# Patient Record
Sex: MALE
[De-identification: names, ages, dates, MRNs, and addresses within clinical notes are randomized per-mention and may not be internally consistent; named-entity substitution may affect disease eponyms.]

---

## 2018-12-09 ENCOUNTER — HOSPITAL ENCOUNTER (INPATIENT)
Dept: HOSPITAL 31 - C.ER | Age: 28
LOS: 3 days | Discharge: HOME | DRG: 773 | End: 2018-12-12
Attending: PSYCHIATRIC UNIT | Admitting: PSYCHIATRIC UNIT
Payer: MEDICAID

## 2018-12-09 VITALS — BODY MASS INDEX: 22.1 KG/M2

## 2018-12-09 DIAGNOSIS — F11.23: Primary | ICD-10-CM

## 2018-12-09 DIAGNOSIS — F17.210: ICD-10-CM

## 2018-12-09 DIAGNOSIS — F43.22: ICD-10-CM

## 2018-12-09 DIAGNOSIS — F14.90: ICD-10-CM

## 2018-12-09 DIAGNOSIS — F12.90: ICD-10-CM

## 2018-12-09 LAB
ALBUMIN SERPL-MCNC: 3.9 G/DL (ref 3.5–5)
ALBUMIN/GLOB SERPL: 1.1 {RATIO} (ref 1–2.1)
ALT SERPL-CCNC: 28 U/L (ref 21–72)
AST SERPL-CCNC: 20 U/L (ref 17–59)
BASOPHILS # BLD AUTO: 0 K/UL (ref 0–0.2)
BASOPHILS NFR BLD: 0.3 % (ref 0–2)
BILIRUB UR-MCNC: NEGATIVE MG/DL
BUN SERPL-MCNC: 8 MG/DL (ref 9–20)
CALCIUM SERPL-MCNC: 9 MG/DL (ref 8.6–10.4)
EOSINOPHIL # BLD AUTO: 0.2 K/UL (ref 0–0.7)
EOSINOPHIL NFR BLD: 2.2 % (ref 0–4)
ERYTHROCYTE [DISTWIDTH] IN BLOOD BY AUTOMATED COUNT: 13.7 % (ref 11.5–14.5)
GFR NON-AFRICAN AMERICAN: > 60
GLUCOSE UR STRIP-MCNC: NORMAL MG/DL
HGB BLD-MCNC: 13.2 G/DL (ref 12–18)
LEUKOCYTE ESTERASE UR-ACNC: (no result) LEU/UL
LYMPHOCYTES # BLD AUTO: 2.7 K/UL (ref 1–4.3)
LYMPHOCYTES NFR BLD AUTO: 26.1 % (ref 20–40)
MCH RBC QN AUTO: 29.4 PG (ref 27–31)
MCHC RBC AUTO-ENTMCNC: 32.6 G/DL (ref 33–37)
MCV RBC AUTO: 90.1 FL (ref 80–94)
MONOCYTES # BLD: 0.6 K/UL (ref 0–0.8)
MONOCYTES NFR BLD: 6.2 % (ref 0–10)
NEUTROPHILS # BLD: 6.8 K/UL (ref 1.8–7)
NEUTROPHILS NFR BLD AUTO: 65.2 % (ref 50–75)
NRBC BLD AUTO-RTO: 0.1 % (ref 0–2)
PH UR STRIP: 5 [PH] (ref 5–8)
PLATELET # BLD: 243 K/UL (ref 130–400)
PMV BLD AUTO: 8.8 FL (ref 7.2–11.7)
PROT UR STRIP-MCNC: NEGATIVE MG/DL
RBC # BLD AUTO: 4.49 MIL/UL (ref 4.4–5.9)
RBC # UR STRIP: NEGATIVE /UL
SP GR UR STRIP: 1.02 (ref 1–1.03)
SQUAMOUS EPITHIAL: < 1 /HPF (ref 0–5)
UROBILINOGEN UR-MCNC: NORMAL MG/DL (ref 0.2–1)
WBC # BLD AUTO: 10.4 K/UL (ref 4.8–10.8)

## 2018-12-09 PROCEDURE — GZHZZZZ GROUP PSYCHOTHERAPY: ICD-10-PCS | Performed by: PSYCHIATRIC UNIT

## 2018-12-09 PROCEDURE — GZ56ZZZ INDIVIDUAL PSYCHOTHERAPY, SUPPORTIVE: ICD-10-PCS | Performed by: PSYCHIATRIC UNIT

## 2018-12-09 NOTE — C.PDOC
History Of Present Illness





Pt is a 28 year old male presents to ED requesting detox from heroin.  Patient 

reports that he injects, and last use was this morning. Patient also reports he 

smokes cigarettes but no alcohol use. Patient also states he is experiencing 

symptoms of withdrawal - hot flashes and abdominal pain. Denies any medical 

problems or other complaints. 





PMD: "Complete care" 





<Valente Verma Jr. - Last Filed: 12/09/18 18:58>


History Per: Patient


History/Exam Limitations: no limitations


Onset/Duration Of Symptoms: Hrs


Current Symptoms Are (Timing): Still Present


Suicide/Self Injury Attempted (Context): None


Modifying Factor(s): Narcotics (Heroin )


Associated Symptoms: denies: Suicidal Thoughts, Suicidal Plan


Involuntary Hold By: None


Recent travel outside of the [place]: No





<Valente Verma Jr. - Last Filed: 12/09/18 18:58>





<Ammon Gibson - Last Filed: 12/09/18 19:56>


Time Seen by Provider: 12/09/18 17:40


Chief Complaint (Nursing): Substance Abuse





Past Medical History


Reviewed: Historical Data, Nursing Documentation, Vital Signs


Vital Signs: 





                                Last Vital Signs











Temp  98.3 F   12/09/18 17:35


 


Pulse  82   12/09/18 17:35


 


Resp  20   12/09/18 17:35


 


BP  99/64 L  12/09/18 17:35


 


Pulse Ox  100   12/09/18 17:35














- Medical History


PMH: No Chronic Diseases


Surgical History: No Surg Hx


Family History: States: No Known Family Hx





- Social History


Hx Tobacco Use: Yes


Hx Alcohol Use: No


Hx Substance Use: Yes





- Immunization History


Hx Tetanus Toxoid Vaccination: Yes


Hx Influenza Vaccination: Yes


Hx Pneumococcal Vaccination: Yes





<Valente Verma Jr. - Last Filed: 12/09/18 18:58>


Vital Signs: 





                                Last Vital Signs











Temp  98.3 F   12/09/18 17:35


 


Pulse  82   12/09/18 17:35


 


Resp  20   12/09/18 17:35


 


BP  99/64 L  12/09/18 17:35


 


Pulse Ox  100   12/09/18 18:59














<Ammon Gibson - Last Filed: 12/09/18 19:56>





Review Of Systems


Constitutional: Positive for: Other (Hot flashes ).  Negative for: Chills


Gastrointestinal: Positive for: Abdominal Pain.  Negative for: Nausea, Vomiting,

Diarrhea


Skin: Negative for: Rash


Neurological: Negative for: Weakness, Numbness





<Valente Verma Jr. - Last Filed: 12/09/18 18:58>





Physical Exam





- Physical Exam


Appears: Non-toxic, No Acute Distress


Skin: Normal Color, Warm, Dry, No Rash


Head: Atraumatic, Normacephalic


Eye(s): bilateral: EOMI


Ear(s): Bilateral: Normal


Nose: Normal


Oral Mucosa: Moist


Tongue: Normal Appearing


Lips: Normal Appearing


Throat: Normal


Neck: Normal ROM, Supple


Chest: Symmetrical, No Tenderness


Cardiovascular: Rhythm Regular, No Murmur


Respiratory: Normal Breath Sounds, No Accessory Muscle Use, No Rales, No 

Rhonchi, No Wheezing


Gastrointestinal/Abdominal: Bowel Sounds (Active ), Soft, No Tenderness


Extremity: Normal ROM, Other (Left upper extremity track marks )


Extremity: Bilateral: Atraumatic, Normal Color And Temperature, Normal ROM


Neurological/Psych: Oriented x3, Normal Speech


Gait: Steady





<Valente Verma Jr. - Last Filed: 12/09/18 18:58>





ED Course And Treatment


O2 Sat by Pulse Oximetry: 100 (RA)


Pulse Ox Interpretation: Normal





<Valente Verma Jr. - Last Filed: 12/09/18 18:58>





- Laboratory Results


Result Diagrams: 


                                 12/09/18 19:05





                                 12/09/18 19:05





<Ammon Gibson - Last Filed: 12/09/18 19:56>





Medical Decision Making


Medical Decision Making: 





Initial Impression:  


Opioid Use Disorder





Initial Plan:


Crisis will evaluate the patient (they are aware he is here).  Will also send of

detox screening labs














<Valente Verma Jr. - Last Filed: 12/09/18 18:58>





Disposition





- Disposition


Disposition Time: 18:56





<Valente Verma Jr. - Last Filed: 12/09/18 18:58>


Discussed With DrEdy: Renato Cutler


Comment: accepted the pt on his service and took over the care at 7:55 PM


Doctor Will See Patient In The: Hospital


Counseled Patient/Family Regarding: Studies Performed, Diagnosis





<Ammon Gibson - Last Filed: 12/09/18 19:56>





- Disposition


Disposition: HOSPITALIZED


Condition: FAIR


Forms:  [facility] (English)





- Clinical Impression


Clinical Impression: 


 Opioid use disorder








- Scribe Statement


The provider has reviewed the documentation as recorded by the Scribe





Pb Pinto





All medical record entries made by the Scribe were at my direction and 

personally dictated by me. I have reviewed the chart and agree that the record 

accurately reflects my personal performance of the history, physical exam, 

medical decision making, and the department course for this patient. I have also

personally directed, reviewed, and agree with the discharge instructions and 

disposition.





<Valente Verma Jr. - Last Filed: 12/09/18 18:58>





Physician Patient Turnover


Patient Signed Over To: Ammon Gibson


Handoff Comments: To check pending labs and follow up with crisis.





<Valente Verma Jr. - Last Filed: 12/09/18 18:58>





Decision To Admit





<Valente Verma Jr. - Last Filed: 12/09/18 18:58>





- Pt Status Changed To:


Hospital Disposition Of: Inpatient





- Admit Certification


Admit to Inpatient:: After my assessment, the patient will require 

hospitalization for at least two midnights.  This is because of the severity of 

symptoms shown, intensity of services needed, and/or the medical risk in this 

patient being treated as an outpatient.





- InPatient:


Physician Admission Certification: I certify that this patient requires 2 or mor

e midnights of care for the following reason:: After my assessment, the patient 

will require hospitalization for at least two midnights.  This is because of the

severity of symptoms shown, intensity of services needed, and/or the medical 

risk in this patient being treated as an outpatient.





- .


Bed Request Type: Detox


Admitting Physician: Renato Cutler





<Ammon Gibson - Last Filed: 12/09/18 19:56>





- .


Patient Diagnosis: 


 Opioid use disorder

## 2018-12-09 NOTE — PCM.BM
<Laila Robledo - Last Filed: 12/09/18 20:14>





Treatment Plan Problems





- Problems identified on initial assessmt


  ** Ineffective Health maintenance


Date Initiated: 12/09/18


Time Initiated: 20:14


Assessment reference: NA


Status: Active





Treatment assets and liabiliti


Patient Assests: cooperative, ADL independent, negotiates basic needs, 

cognitively intact


Patient Liabilities: substance abuse (opiates,cocaine,THC)





- Milieu Protocol


Maintain good personal hygiene: daily Encourage regular showers, daily Remind 

patient to perform daily oral care, daily Assist patient to perform ADL's


Conduct patient checks and document Observation sheet: Q15 minutes


Maintain personal safety: every shift Educate patient to report safety concerns 

to staff, every shift Monitor environment for contraband/sharps


Medication safety: Monitor for expected outcome, potential side effects: every 

shift, Assess barriers to learning: every shift, Assess readiness for medication

education: every shift





<Zeny Lim - Last Filed: 12/12/18 11:06>





Family Contact


Family involvement: Famliy/SO not involved





- Goals for Treatment


Patient goals for treatment: Complete detox and transition to outpatient 

counseling program.





Discharge/Continuing Care





- Education Needs


Education Needs: Patient Medication, Patient Diagnosis/Disease Process, Patient 

Coping Skills, Patient Anger Management skills, Patient Placement options, 

Patient Community resources





- Discharge


Discharge Criteria: No longer exhibiting s/s of withdrawal, Reduction of target 

symptoms


Discharge to:: Home





- Treatment Team Participation


Patient/Family/SO Statement: 





12/12/18 11:05


"I got some legal stuff I gotta take care of...I can go to outpatient though..."


Discussed with Family/SO: No


Was Patient/Family/SO present at Treatment Team Meeting: Yes

## 2018-12-10 VITALS — RESPIRATION RATE: 18 BRPM

## 2018-12-10 RX ADMIN — BUPRENORPHINE HCL SCH MG: 2 TABLET SUBLINGUAL at 10:13

## 2018-12-10 NOTE — PCM.PSYCH
Initial Psychiatric Evaluation





- Initial Psychiatric Evaluation


Type of Admission: Voluntary


Legal Status: Capacity


History of Present Illness and Precipitating Events: 





Patient is a 28 year old male who is currently in a relationship, lives with his

mother, and is unemployed. He has one daughter, who is 1 years old. 





The patient came to detox yesterday for heroin use and so he could potentially 

get permission to see his daughter again. He says he sniffs and injects heroin. 

He last used 5 bags of heroin. He normally does 9-10 bags of heroin a day. He 

also uses crack and Percocet. He last used $30 worth of crack a couple of days 

ago. He used to smoke marijuana everyday. He no longer uses marijuana. This is 

the first time he's ever been to detox. 





He denies currently feeling depressed. He states that he occasionally feels 

depressed when he cant speak to his daughter. He denies suicidal or homicidal 

ideation. He denies having problems with sleep, energy, or guilt. He denies 

visual or auditory hallucinations, and denies any racing thoughts. 


He also denies alcohol use. He smokes a pack of cigarettes a day for a couple 

of years. 





Psych History- Anxiety and depressive sxs on and off, no admissions


Family Psych History-denies 


PMH-denies 





Current Medications: 





Active Medications











Generic Name Dose Route Start Last Admin





  Trade Name Diana  PRN Reason Stop Dose Admin


 


Al Hydrox/Mg Hydrox/Simethicone  30 ml  12/09/18 21:09  





  Maalox 30 Ml  PO   





  TID PRN   





  Indigestion / Heartburn   





     





     





     


 


Buprenorphine HCl  8 mg  12/10/18 10:00  12/10/18 10:13





  Subutex  SL  12/14/18 09:59  8 mg





  DAILY LEAH   Administration





     





  Taper   





     





     


 


Clonidine HCl  0.1 mg  12/09/18 21:01  





  Catapres  PO   





  Q4 PRN   





  COWS Score More or Equal to 5   





     





     





     


 


Dicyclomine HCl  20 mg  12/09/18 21:11  





  Bentyl  PO   





  Q6 PRN   





  Abdominal cramps   





     





     





     


 


Gabapentin  300 mg  12/10/18 10:00  12/10/18 10:13





  Neurontin  PO   300 mg





  TID LEAH   Administration





     





     





     





     


 


Ibuprofen  400 mg  12/09/18 21:12  





  Motrin Tab  PO   





  Q6 PRN   





  Pain, moderate (4-7)   





     





     





     


 


Loperamide HCl  2 mg  12/09/18 21:09  





  Imodium  PO   





  Q8 PRN   





  Diarrhea   





     





     





     


 


Nicotine  1 patch  12/10/18 11:30  





  Nicoderm Cq  TD   





  DAILY LEAH   





     





     





     





     


 


Ondansetron HCl  4 mg  12/09/18 21:09  





  Zofran Tab  PO   





  Q8 PRN   





  Nausea/Vomiting   





     





     





     


 


Trazodone HCl  50 mg  12/09/18 22:00  12/09/18 21:53





  Desyrel  PO   50 mg





  HS LEAH   Administration





     





     





     





     














Past Psychiatric History





- Past Psychiatric History


Previous Treatment History: None


Pertinent Medical Hx (Current Medical&Sleep Prob, Allergies): 





                                    Allergies











Allergy/AdvReac Type Severity Reaction Status Date / Time


 


No Known Allergies Allergy   Verified 12/09/18 17:34








                                        





No Known Home Med  12/09/18 











Review of Systems





- Neurological


Neurological: UNREMARKABLE





- Psychiatric


Psychiatric: Abnormal Sleep Pattern, Anhedonia, Anxiety, Depression (mild), 

Difficulty Concentrating.  absent: Homicidal Ideation, Paranoia, Suicidal 

Ideation





Mental Status Examination





- Personal Presentation


Personal Presentation: Looks stated age





- Affect


Affect: Constricted





- Motor Activity


Motor Activity: Calm





- Reliability in Providing Information


Reliability in Providing Information: Fair





- Speech


Speech: Organized





- Mood


Mood: Depressed, Anxious





- Formal Thought Process


Formal Thought Process: No Impairment





- Cognitive Functions


Orientation: Person, Place, Situation, Time


Sensorium: Alert


Attention/Concentration: Easily distracted


Estimate of Intelligence: Average


Judgement: Intact, as evidence by: Insight regarding need for hospitalization


Memory: Recent intact, as evidence by: Ability to recall events of the day, 

Remote intact, as evidenced by: Abilit to recall sig. life events





- Risk


Risk: Diminished functioning





- Strength & Assets Inventory


Strength & Assets Inventory: Cooperative





- Limitations


Limitations: Living alone





DSM 5 DX





- DSM 5


DSM 5 Diagnosis: 





Opioid withdrawal


Opioid use d/o - severe


cocaine use d/o - moderate


Cannabis use d/o - severe


Adjustment d/o with anxiety and depression





- Recommended/Plan of Treatment


Treatment Recommendations and Plan of Treatment: 





Taper with Suboxone


Gabapentin for augmentation 


As needed medications 


All risks, benefits and alternatives of the meds discussed, 


and the pt agreed and understood. 


Attend groups and activities 


Supportive therapy and psychoeducation 


MI for abstinence 


CBT for relapse prevention 


Encourage MAT 


Refer to rehab or IOP, and self-help groups 


Teach healthy lifestyle methods, i.e. diet, exercise, meditation 


Smoking cessation with MI 


Nicotine patch if needed





34 min


Projected ELOS: 4 days





- Smoking Cessation


Smoking Cessation Initiated: Yes

## 2018-12-11 RX ADMIN — BUPRENORPHINE HCL SCH MG: 2 TABLET SUBLINGUAL at 09:45

## 2018-12-12 VITALS — OXYGEN SATURATION: 99 %

## 2018-12-12 VITALS — TEMPERATURE: 97.5 F | HEART RATE: 106 BPM | SYSTOLIC BLOOD PRESSURE: 136 MMHG | DIASTOLIC BLOOD PRESSURE: 92 MMHG

## 2018-12-12 RX ADMIN — BUPRENORPHINE HCL SCH MG: 2 TABLET SUBLINGUAL at 09:34

## 2018-12-12 NOTE — PCM.PYCHPN
Psychiatric Progress Note





- Psychiatric Progress Note


Patient seen today, length of contact: 15 min





Mental Status Examination





- Cognitive Function


Orientation: Person, Place, Situation, Time





- Mood


Mood: Depressed, Anxious





- Affect


Affect: Constricted





- Formal Thought Process


Formal Thought Process: No Impairment





- Homicidal Ideation


Homicidal Ideation: No





Goal/Treatment Plan





- Goal/Treatment Plan


Progress Toward Problem(s) and Goals/Treatment Plan: 





Taper with Suboxone


Gabapentin for augmentation 


As needed medications 


All risks, benefits and alternatives of the meds discussed, 


and the pt agreed and understood. 


Attend groups and activities 


Supportive therapy and psychoeducation 


MI for abstinence 


CBT for relapse prevention 


Encourage MAT 


Refer to rehab or IOP, and self-help groups 


Teach healthy lifestyle methods, i.e. diet, exercise, meditation 


Smoking cessation with MI 


Nicotine patch if needed





34 min

## 2018-12-12 NOTE — PCM.PYCHDC
Mental Status Examination





- Mental Status Examination


Orientation: Person, Place, Situation, Time


Memory: Intact


Mood: Anxious


Affect: Constricted


Speech: Appropriate


Attention: WNL


Concentration: WNL


Association: WNL


Fund of Knowledge: WNL


Formal Thought Process: No Impairment


Suicidal Ideation: No


Current Homicidal Ideation?: No





Discharge Summary





- Discharge Note


Consultations:: List each consultation separately and include:  1. Reason for 

request.  2. Findings.  3. Follow-up


Summary of Hospital Course include:: 1. Description of specific treatment plan 

utilized for patients during their course of treatmen.  2. Summarize the time-

course for resolution of acute symptoms and/or regressed behaviors.  3. Describe

issues identified and worked on during hospitalization.  4. Describe medication 

utilized.  5. Describe medical problems identified and treated.  6. Reassessment

of suicide risk


Summary of Hospital Course: 





Patient is a 28 year old male who is currently in a relationship, lives with his

mother, and is unemployed. He has one daughter, who is 1 years old. 





The patient came to detox yesterday for heroin use and so he could potentially 

get permission to see his daughter again. He says he sniffs and injects heroin. 

He last used 5 bags of heroin. He normally does 9-10 bags of heroin a day. He 

also uses crack and Percocet. He last used $30 worth of crack a couple of days 

ago. He used to smoke marijuana everyday. He no longer uses marijuana. This is 

the first time he's ever been to detox. 





He denies currently feeling depressed. He states that he occasionally feels 

depressed when he cant speak to his daughter. He denies suicidal or homicidal 

ideation. He denies having problems with sleep, energy, or guilt. He denies 

visual or auditory hallucinations, and denies any racing thoughts. 


He also denies alcohol use. He smokes a pack of cigarettes a day for a couple 

of years. 





Psych History- Anxiety and depressive sxs on and off, no admissions


Family Psych History-denies 


PMH-denies 





Hospital course:


The pt was admitted and started on treatment with psychotherapy, support, psy

choeducation and medications. 


MI and CBT used.


The pt attended groups and activities, as well as milieu therapy.


All the risks and benefits of medications are discussed and the patient 

understood and agreed.


The pt improved with the treatments provided. 


After care discussed with the patient.








[facility] but he first needs to go to a court tomorrow. Since the court was

at 9 am, he wanted to leave today. 





- Final Diagnosis (DSM 5)


Condition upon Discharge: FAIR


DSM 5: 


Opioid withdrawal


Opioid use d/o - severe


cocaine use d/o - moderate


Cannabis use d/o - severe


Adjustment d/o with anxiety and depression








Disposition: HOME/ ROUTINE


Follow-up Treatment Plan: 


Continue below medications after discharge.


Follow after care plan as discussed.


Use relapse prevention skills   


Return to ER or call 911 if suicidal, homicidal or symptoms relapse.


Stay away from stress, alcohol and drugs.


See primary doctor regularly and get labs.    








Prescriptions/Medication Reconciliation: 


traZODone [Desyrel] 50 mg PO HS #30 tab

## 2018-12-12 NOTE — RAD
HISTORY:

 rehab admission 



COMPARISON:

No prior. 



TECHNIQUE:

Chest PA and lateral



FINDINGS:





LUNGS:

No focal consolidation.



Please note that chest x-ray has limited sensitivity for the 

detection of pulmonary masses.



PLEURA:

No significant pleural effusion identified. No definite pneumothorax .



CARDIOVASCULAR:

Heart size appears within normal limits.  No atherosclerotic 

calcification present.



OSSEOUS STRUCTURES:

No acute osseous abnormality identified.



VISUALIZED UPPER ABDOMEN:

Unremarkable.



OTHER FINDINGS:

None.



IMPRESSION:

No focal consolidation, significant pleural effusion, or definite 

pneumothorax identified.